# Patient Record
(demographics unavailable — no encounter records)

---

## 2024-10-08 NOTE — REASON FOR VISIT
[Follow-Up] : a follow-up visit [Home] : at home, [unfilled] , at the time of the visit. [Medical Office: ([facility])___] : at the medical office located in  [Family Member] : family member [Patient] : the patient [FreeTextEntry2] : Mary Lou Hussein

## 2024-10-08 NOTE — DISCUSSION/SUMMARY
[Cancer Type / Location / Histology Subtype: ________] : Cancer Type / Location / Histology Subtype: [unfilled] [Diagnosis Date (year): ____] : Diagnosis Date (year): [unfilled] [Surgery] : Surgery: Yes [Surgery Date(s) (year): ____] : Surgery Date(s) (year): [unfilled] [Surgical Procedure / Location / Findings: _________] : Surgical Procedure / Location / Findings: [unfilled] [Radiation] : Radiation: Yes [Systemic Therapy (chemotherapy, hormonal therapy, other)] : Systemic Therapy (chemotherapy, hormonal therapy, other): No [FreeTextEntry1] : Breast mass was found on routine mammogram in 2012; US -guided biopsy, 12/7/2012, found 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 SBR score 5/9 (2+2+1). Invasive tumor measures at least 0.4 cm. DCIS, cribiform pattern with intermediate grade nuclear atypia. Microcalcifications are presents in non-neoplastic breast. Lymphovascular permeation by tumor not seen. ER positive (98%), ID positive (60%), HER2 negative She underwent a lumpectomy with SLNB. Final pathology: Infiltrating duct cell carcinoma, completely excised. Size of largest focus off invasive carcinoma is 9 mm. Two negative lymph nodes. Oncotype Dx recurrent score of 13 with 8% average rate of distant recurrence. No chemotherapy was given. The patient completed radiation therapy and Anastrozole was started June 2013 with plan for 5 years of treatment, now extended to 10 years. GYN: Dr. Macey Maldonado ([phone]) Surgeon: Dr. Jo ([facility]) Radiation: Dr. Jo

## 2024-10-08 NOTE — HEALTH RISK ASSESSMENT
[Never] : Never [Intercurrent hospitalizations] : was admitted to the hospital  [No] : No [FreeTextEntry7] : Lamine most of her meals at home -- B -- slice of bread, L -- bread and rice with vegetable/lentil, D -- meat with fish with bread or rice.  [FreeTextEntry8] : Goes to sleep around 1130pm and wakes up 430am to pray and then tries to go back to sleep, [FreeTextEntry9] : Has been doing some stretches for her leg. Was doing yoga in the past but because of recent hospitalizations, she stopped and has not gotten back to it.  [de-identified] : March 2023 -- at [facility] -- for biliary complications from gallstones.

## 2024-10-08 NOTE — DATA REVIEWED
[FreeTextEntry1] : ECG completed in clinic today -- normal sinus rhythm, normal axis, no ST or T wave abnormality.

## 2024-10-08 NOTE — DISCUSSION/SUMMARY
[Cancer Type / Location / Histology Subtype: ________] : Cancer Type / Location / Histology Subtype: [unfilled] [Diagnosis Date (year): ____] : Diagnosis Date (year): [unfilled] [Surgery] : Surgery: Yes [Surgery Date(s) (year): ____] : Surgery Date(s) (year): [unfilled] [Surgical Procedure / Location / Findings: _________] : Surgical Procedure / Location / Findings: [unfilled] [Radiation] : Radiation: Yes [Systemic Therapy (chemotherapy, hormonal therapy, other)] : Systemic Therapy (chemotherapy, hormonal therapy, other): No [FreeTextEntry1] : Breast mass was found on routine mammogram in 2012; US -guided biopsy, 12/7/2012, found 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 SBR score 5/9 (2+2+1). Invasive tumor measures at least 0.4 cm. DCIS, cribiform pattern with intermediate grade nuclear atypia. Microcalcifications are presents in non-neoplastic breast. Lymphovascular permeation by tumor not seen. ER positive (98%), [place] positive (60%), HER2 negative She underwent a lumpectomy with SLNB. Final pathology: Infiltrating duct cell carcinoma, completely excised. Size of largest focus off invasive carcinoma is 9 mm. Two negative lymph nodes. Oncotype Dx recurrent score of 13 with 8% average rate of distant recurrence. No chemotherapy was given. The patient completed radiation therapy and Anastrozole was started June 2013 with plan for 5 years of treatment, now extended to 10 years. GYN: Dr. Macey Maldonado ([phone]) Surgeon: Dr. Jo ([facility]) Radiation: Dr. Jo

## 2024-10-08 NOTE — PHYSICAL EXAM
[No Acute Distress] : no acute distress [Well Nourished] : well nourished [Well-Appearing] : well-appearing [No Respiratory Distress] : no respiratory distress  [No Accessory Muscle Use] : no accessory muscle use [Normal Affect] : the affect was normal [Normal Insight/Judgement] : insight and judgment were intact [de-identified] : Straight leg raise on the LEFT results in LEFT hip pain that is localized and does not radiate below the knee. No tenderness above the trochanteric bursa.  [Normal] : affect was normal and insight and judgment were intact [de-identified] : obese [de-identified] : Noted dry mouth

## 2024-10-08 NOTE — HISTORY OF PRESENT ILLNESS
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 DCIS, She underwent a lumpectomy with SLNB. Final pathology: Infiltrating duct cell carcinoma, completely excised. Size of largest focus off invasive carcinoma is 9 mm. Two negative lymph nodes. No chemotherapy was given. The patient completed radiation therapy and Anastrozole which was started June 2013 for 10 years and completed 2023.  Pt being seen today for survivorship follow up.  Overall, feeling well today. Reviewed mammogram and ultrasound breast results from September 25, 2024 which were read negative Patient reports seeing her dentist regarding her dry mouth symptoms, she says her dentist prescribed antibiotic therapy for a year and patient feels she unable to follow through with this regimen due to fasting several times throughout the year. She finds certain over-the-counter mouthwashes help temporarily relieve the dryness and will continue to use. She reports right upper quadrant 7 out of 10 pain and sometimes 10 out of 10 pain due to a partial cholecystectomy she had done 20 years ago.  She has been treated several times including an emergency room visit last year March 2023 due to cholecystitis. She is now scheduled to have a laparoscopic completion of cholecystectomy on November 13, 2024.  Pt encourage to continue her exercise regimen which includes Physical therapy 2 x a week, resistance training and stretching for 30 minutes every other day and 30 minutes of walking each day once in the morning and again after dinner.

## 2024-10-08 NOTE — PHYSICAL EXAM
[No Acute Distress] : no acute distress [Well Nourished] : well nourished [Well-Appearing] : well-appearing [No Respiratory Distress] : no respiratory distress  [No Accessory Muscle Use] : no accessory muscle use [Normal Affect] : the affect was normal [Normal Insight/Judgement] : insight and judgment were intact [de-identified] : Straight leg raise on the LEFT results in LEFT hip pain that is localized and does not radiate below the knee. No tenderness above the trochanteric bursa.  [Normal] : affect was normal and insight and judgment were intact [de-identified] : obese [de-identified] : Noted dry mouth

## 2024-10-08 NOTE — HEALTH RISK ASSESSMENT
[Never] : Never [Intercurrent hospitalizations] : was admitted to the hospital  [No] : No [FreeTextEntry7] : Lamine most of her meals at home -- B -- slice of bread, L -- bread and rice with vegetable/lentil, D -- meat with fish with bread or rice.  [FreeTextEntry8] : Goes to sleep around 1130pm and wakes up 430am to pray and then tries to go back to sleep, [FreeTextEntry9] : Has been doing some stretches for her leg. Was doing yoga in the past but because of recent hospitalizations, she stopped and has not gotten back to it.  [de-identified] : March 2023 -- at [facility] -- for biliary complications from gallstones.

## 2024-10-08 NOTE — PLAN
[Survivorship] : Survivorship [FreeTextEntry3] : Needs annual breast exam and chest wall exam.  Needs annual mammogram and ultrasound of the bilateral breasts.  Next due for both September 2025.  [FreeTextEntry5] : Completed colon cancer screening in 2020 -- based on pathology from polyp pathology, does not need repeat until after 2025. Lung Cancer screening not indicated.  Gyn screening completed in 2021.  [FreeTextEntry7] : Needs annual ECG given radiation history to the LEFT breast -- this was completed today and is within normal limits.  Also needs annual TSH given radiation history.  [de-identified] : Osteoporosis - DEXA scan completed 8/2024 and results reveal Osteopenia. Continue taking calcium and vitamin D supplements. Continue PT, daily weight bearing exercises.    Obesity --  Pt encourage to continue her exercise regimen which includes Physical therapy 2 x a week, resistance training and stretching for 30 minutes every other day and 30 minutes of walking twice a day. Pt also encouraged to maintain a healthy weight and diet. Full set of labs ordered 10/7/2024  [de-identified] : Discussed the importance of exercise, and she has restarted her chair yoga which she finds somewhat helpful. Also discussed revisiting acupuncture as she did get some relief with that.

## 2024-10-08 NOTE — PLAN
[Survivorship] : Survivorship [FreeTextEntry3] : Needs annual breast exam and chest wall exam.  Needs annual mammogram and ultrasound of the bilateral breasts.  Next due for both September 2025.  [FreeTextEntry5] : Completed colon cancer screening in 2020 -- based on pathology from polyp pathology, does not need repeat until after 2025. Lung Cancer screening not indicated.  Gyn screening completed in 2021.  [FreeTextEntry7] : Needs annual ECG given radiation history to the LEFT breast -- this was completed today and is within normal limits.  Also needs annual TSH given radiation history.  [de-identified] : Osteoporosis - DEXA scan completed 8/2024 and results reveal Osteopenia. Continue taking calcium and vitamin D supplements. Continue PT, daily weight bearing exercises.    Obesity --  Pt encourage to continue her exercise regimen which includes Physical therapy 2 x a week, resistance training and stretching for 30 minutes every other day and 30 minutes of walking twice a day. Pt also encouraged to maintain a healthy weight and diet. Full set of labs ordered 10/7/2024  [de-identified] : Discussed the importance of exercise, and she has restarted her chair yoga which she finds somewhat helpful. Also discussed revisiting acupuncture as she did get some relief with that.

## 2024-10-08 NOTE — REASON FOR VISIT
[Follow-Up] : a follow-up visit [Home] : at home, [unfilled] , at the time of the visit. [Medical Office: ([facility])___] : at the medical office located in  [Family Member] : family member [Patient] : the patient [FreeTextEntry2] : Mary Lou Hussein

## 2024-10-30 NOTE — DISCUSSION/SUMMARY
[Cancer Type / Location / Histology Subtype: ________] : Cancer Type / Location / Histology Subtype: [unfilled] [Diagnosis Date (year): ____] : Diagnosis Date (year): [unfilled] [Surgery] : Surgery: Yes [Surgery Date(s) (year): ____] : Surgery Date(s) (year): [unfilled] [Surgical Procedure / Location / Findings: _________] : Surgical Procedure / Location / Findings: [unfilled] [Radiation] : Radiation: Yes [Systemic Therapy (chemotherapy, hormonal therapy, other)] : Systemic Therapy (chemotherapy, hormonal therapy, other): No [FreeTextEntry1] : Breast mass was found on routine mammogram in 2012; US -guided biopsy, 12/7/2012, found 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 SBR score 5/9 (2+2+1). Invasive tumor measures at least 0.4 cm. DCIS, cribiform pattern with intermediate grade nuclear atypia. Microcalcifications are presents in non-neoplastic breast. Lymphovascular permeation by tumor not seen. ER positive (98%), [place] positive (60%), HER2 negative She underwent a lumpectomy with SLNB. Final pathology: Infiltrating duct cell carcinoma, completely excised. Size of largest focus off invasive carcinoma is 9 mm. Two negative lymph nodes. Oncotype Dx recurrent score of 13 with 8% average rate of distant recurrence. No chemotherapy was given. The patient completed radiation therapy and Anastrozole was started June 2013 with plan for 5 years of treatment, now extended to 10 years. GYN: Dr. Macey Maldonado ([phone]) Surgeon: Dr. Jo ([facility]) Radiation: Dr. Jo

## 2024-10-30 NOTE — PHYSICAL EXAM
[No Acute Distress] : no acute distress [Well Nourished] : well nourished [Well-Appearing] : well-appearing [No Respiratory Distress] : no respiratory distress  [No Accessory Muscle Use] : no accessory muscle use [Normal Affect] : the affect was normal [Normal Insight/Judgement] : insight and judgment were intact [Normal] : affect was normal and insight and judgment were intact [de-identified] : obese [de-identified] : Noted dry mouth

## 2024-10-30 NOTE — HISTORY OF PRESENT ILLNESS
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 DCIS, She underwent a lumpectomy with SLNB. Final pathology: Infiltrating duct cell carcinoma, completely excised. Size of largest focus off invasive carcinoma is 9 mm. Two negative lymph nodes. No chemotherapy was given. The patient completed radiation therapy and Anastrozole which was started June 2013 for 10 years and completed 2023.  Pt being seen today for survivorship follow up.  Overall, feeling well today. Reviewed recent lab results. Reviewed mammogram and ultrasound breast results from September 25, 2024 which were read negative Pt looking foward to a completion cholecystectomy on 11/13/2024.   Patient reports seeing her dentist regarding her dry mouth symptoms, she says her dentist prescribed antibiotic therapy for a year and patient feels she unable to follow through with this regimen due to fasting several times throughout the year. She finds certain over-the-counter mouthwashes help temporarily relieve the dryness and will continue to use. She reports right upper quadrant 7 out of 10 pain and sometimes 10 out of 10 pain due to a partial cholecystectomy she had done 20 years ago.  She has been treated several times including an emergency room visit last year March 2023 due to cholecystitis. She is now scheduled to have a laparoscopic completion of cholecystectomy on November 13, 2024.  Pt encourage to continue her exercise regimen which includes Physical therapy 2 x a week, resistance training and stretching for 30 minutes every other day and 30 minutes of walking each day once in the morning and again after dinner.

## 2024-10-30 NOTE — PLAN
[Survivorship] : Survivorship [FreeTextEntry3] : Needs annual breast exam and chest wall exam.  Needs annual mammogram and ultrasound of the bilateral breasts.  Next due for both September 2025.  [FreeTextEntry5] : Completed colon cancer screening in 2020 -- based on pathology from polyp pathology, does not need repeat until after 2025. Lung Cancer screening not indicated.  Gyn screening completed in 2021.  [FreeTextEntry7] : Needs annual ECG given radiation history to the LEFT breast -- this was completed today and is within normal limits.  Also needs annual TSH given radiation history.  [de-identified] : Osteoporosis - DEXA scan completed 8/2024 and results reveal Osteopenia. Continue taking calcium and vitamin D supplements. Continue PT, daily weight bearing exercises.    Obesity --  Pt encourage to continue her exercise regimen which includes Physical therapy 2 x a week, resistance training and stretching for 30 minutes every other day and 30 minutes of walking twice a day. Pt also encouraged to maintain a healthy weight and diet. Full set of labs done 10/7/2024  [de-identified] : Discussed the importance of exercise, and she has restarted her chair yoga which she finds somewhat helpful. Also discussed revisiting acupuncture as she did get some relief with that.

## 2024-10-30 NOTE — HEALTH RISK ASSESSMENT
[Never] : Never [Intercurrent hospitalizations] : was admitted to the hospital  [No] : No [FreeTextEntry7] : Lamine most of her meals at home -- B -- slice of bread, L -- bread and rice with vegetable/lentil, D -- meat with fish with bread or rice.  [FreeTextEntry8] : Goes to sleep around 1130pm and wakes up 430am to pray and then tries to go back to sleep, [FreeTextEntry9] : Has been doing some stretches for her leg. Was doing yoga in the past but because of recent hospitalizations, she stopped and has not gotten back to it.  [de-identified] : March 2023 -- at [facility] -- for biliary complications from gallstones.

## 2024-10-30 NOTE — REASON FOR VISIT
[Home] : at home, [unfilled] , at the time of the visit. [Medical Office: ([facility])___] : at the medical office located in  [Patient] : the patient [Follow-Up] : a follow-up visit [Family Member] : family member [FreeTextEntry2] : Mary Lou Hussein

## 2024-11-25 NOTE — PLAN
[FreeTextEntry1] : 64 year old woman s/p completion cholecystectomy - pathology: chronic cholecystitis and cholelithiasis discussed with patient and family - follow up as needed

## 2024-11-25 NOTE — HISTORY OF PRESENT ILLNESS
[de-identified] : 64 year old woman s/p laparoscopic, robotic assisted, completion cholecystectomy, on 11/13/2024. [de-identified] : Patient presents to the office for a follow up visit. Had nausea for the first two post-op days after surgery; required ODT Zofran.  Today in the office denies any nausea, vomiting, fever or chills. Tolerating PO intake with normal GI and  function. Ambulating without issues.   On exam: awake, alert No scleral icterus Breathing comfortably on room air Abd is soft, not distended, and not tender Incisions are clean, dry and intact without erythema

## 2025-02-03 NOTE — DISCUSSION/SUMMARY
[Cancer Type / Location / Histology Subtype: ________] : Cancer Type / Location / Histology Subtype: [unfilled] [Diagnosis Date (year): ____] : Diagnosis Date (year): [unfilled] [Surgery] : Surgery: Yes [Surgery Date(s) (year): ____] : Surgery Date(s) (year): [unfilled] [Surgical Procedure / Location / Findings: _________] : Surgical Procedure / Location / Findings: [unfilled] [Radiation] : Radiation: Yes [Systemic Therapy (chemotherapy, hormonal therapy, other)] : Systemic Therapy (chemotherapy, hormonal therapy, other): No [FreeTextEntry1] : Breast mass was found on routine mammogram in 2012; US -guided biopsy, 12/7/2012, found 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 SBR score 5/9 (2+2+1). Invasive tumor measures at least 0.4 cm. DCIS, cribiform pattern with intermediate grade nuclear atypia. Microcalcifications are presents in non-neoplastic breast. Lymphovascular permeation by tumor not seen. ER positive (98%), [place] positive (60%), HER2 negative She underwent a lumpectomy with SLNB. Final pathology: Infiltrating duct cell carcinoma, completely excised. Size of largest focus off invasive carcinoma is 9 mm. Two negative lymph nodes. Oncotype Dx recurrent score of 13 with 8% average rate of distant recurrence. No chemotherapy was given. The patient completed radiation therapy and Anastrozole was started June 2013 with plan for 5 years of treatment, now extended to 10 years. GYN: Dr. Macey Maldonado ([phone]) Surgeon: Dr. Jo ([facility]) Radiation: Dr. Jo

## 2025-02-03 NOTE — PLAN
[FreeTextEntry7] : Needs annual ECG given radiation history to the LEFT breast -- this was completed today and is within normal limits.  Also needs annual TSH given radiation history.  [Survivorship] : Survivorship [FreeTextEntry3] : Needs annual breast exam and chest wall exam. 2/3/2025 Needs annual mammogram and ultrasound of the bilateral breasts. - 9/2025.  [FreeTextEntry6] : Lung Cancer screening not indicated.  Gyn screening completed in 2021.  [FreeTextEntry5] : Completed colon cancer screening in 2020 -- based on pathology from polyp pathology, does not need repeat until after 2025.  [FreeTextEntry8] : Needs annual ECG given radiation history to the LEFT breast -- this was completed 2/2025 and is within normal limits.  Also needs annual TSH given radiation history. 10/2024 [FreeTextEntry9] : Osteoporosis - DEXA scan completed 8/2024 and results reveal Osteopenia. [de-identified] : Continue taking calcium and vitamin D supplements. Continue PT, daily weight bearing exercises.    Obesity --  Pt encourage to continue her exercise regimen which includes Physical therapy 2 x a week, resistance training and stretching for 30 minutes every other day and 30 minutes of walking twice a day. Pt also encouraged to maintain a healthy weight and diet. Full set of labs done 10/7/2024  [de-identified] : Discussed the importance of exercise, and she has restarted her chair yoga which she finds somewhat helpful. Also discussed revisiting acupuncture as she did get some relief with that.

## 2025-02-03 NOTE — PHYSICAL EXAM
[No Acute Distress] : no acute distress [Well Nourished] : well nourished [Well-Appearing] : well-appearing [No Respiratory Distress] : no respiratory distress  [No Accessory Muscle Use] : no accessory muscle use [Normal Affect] : the affect was normal [Normal Insight/Judgement] : insight and judgment were intact [Normal] : affect was normal and insight and judgment were intact [Normal Appearance] : normal in appearance [No Masses] : no palpable masses [No Nipple Discharge] : no nipple discharge [No Axillary Lymphadenopathy] : no axillary lymphadenopathy [de-identified] : obese [de-identified] : Noted dry mouth

## 2025-02-03 NOTE — DISCUSSION/SUMMARY
[Cancer Type / Location / Histology Subtype: ________] : Cancer Type / Location / Histology Subtype: [unfilled] [Diagnosis Date (year): ____] : Diagnosis Date (year): [unfilled] [Surgery] : Surgery: Yes [Surgery Date(s) (year): ____] : Surgery Date(s) (year): [unfilled] [Surgical Procedure / Location / Findings: _________] : Surgical Procedure / Location / Findings: [unfilled] [Radiation] : Radiation: Yes [Systemic Therapy (chemotherapy, hormonal therapy, other)] : Systemic Therapy (chemotherapy, hormonal therapy, other): No [FreeTextEntry1] : Breast mass was found on routine mammogram in 2012; US -guided biopsy, 12/7/2012, found 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 SBR score 5/9 (2+2+1). Invasive tumor measures at least 0.4 cm. DCIS, cribiform pattern with intermediate grade nuclear atypia. Microcalcifications are presents in non-neoplastic breast. Lymphovascular permeation by tumor not seen. ER positive (98%), [place] positive (60%), HER2 negative She underwent a lumpectomy with SLNB. Final pathology: Infiltrating duct cell carcinoma, completely excised. Size of largest focus off invasive carcinoma is 9 mm. Two negative lymph nodes. Oncotype Dx recurrent score of 13 with 8% average rate of distant recurrence. No chemotherapy was given. The patient completed radiation therapy and Anastrozole was started June 2013 with plan for 5 years of treatment, now extended to 10 years. GYN: Dr. Macye Maldonado ([phone]) Surgeon: Dr. Jo ([facility]) Radiation: Dr. Jo

## 2025-02-03 NOTE — PLAN
[FreeTextEntry7] : Needs annual ECG given radiation history to the LEFT breast -- this was completed today and is within normal limits.  Also needs annual TSH given radiation history.  [Survivorship] : Survivorship [FreeTextEntry3] : Needs annual breast exam and chest wall exam. 2/3/2025 Needs annual mammogram and ultrasound of the bilateral breasts. - 9/2025.  [FreeTextEntry6] : Lung Cancer screening not indicated.  Gyn screening completed in 2021.  [FreeTextEntry5] : Completed colon cancer screening in 2020 -- based on pathology from polyp pathology, does not need repeat until after 2025.  [FreeTextEntry8] : Needs annual ECG given radiation history to the LEFT breast -- this was completed 2/2025 and is within normal limits.  Also needs annual TSH given radiation history. 10/2024 [FreeTextEntry9] : Osteoporosis - DEXA scan completed 8/2024 and results reveal Osteopenia. [de-identified] : Continue taking calcium and vitamin D supplements. Continue PT, daily weight bearing exercises.    Obesity --  Pt encourage to continue her exercise regimen which includes Physical therapy 2 x a week, resistance training and stretching for 30 minutes every other day and 30 minutes of walking twice a day. Pt also encouraged to maintain a healthy weight and diet. Full set of labs done 10/7/2024  [de-identified] : Discussed the importance of exercise, and she has restarted her chair yoga which she finds somewhat helpful. Also discussed revisiting acupuncture as she did get some relief with that.

## 2025-02-03 NOTE — REASON FOR VISIT
[Home] : at home, [unfilled] , at the time of the visit. [Medical Office: ([facility])___] : at the medical office located in  [Family Member] : family member [Patient] : the patient [FreeTextEntry2] : Mary Lou Hussein [Acute] : an acute visit

## 2025-02-03 NOTE — HEALTH RISK ASSESSMENT
[Never] : Never [Intercurrent hospitalizations] : was admitted to the hospital  [No] : No [FreeTextEntry7] : Lamine most of her meals at home -- B -- slice of bread, L -- bread and rice with vegetable/lentil, D -- meat with fish with bread or rice.  [FreeTextEntry8] : Goes to sleep around 1130pm and wakes up 430am to pray and then tries to go back to sleep, [FreeTextEntry9] : Has been doing some stretches for her leg. Was doing yoga in the past but because of recent hospitalizations, she stopped and has not gotten back to it.  [de-identified] : March 2023 -- at [facility] -- for biliary complications from gallstones.

## 2025-02-03 NOTE — HEALTH RISK ASSESSMENT
[Never] : Never [Intercurrent hospitalizations] : was admitted to the hospital  [No] : No [FreeTextEntry7] : Lamine most of her meals at home -- B -- slice of bread, L -- bread and rice with vegetable/lentil, D -- meat with fish with bread or rice.  [FreeTextEntry8] : Goes to sleep around 1130pm and wakes up 430am to pray and then tries to go back to sleep, [FreeTextEntry9] : Has been doing some stretches for her leg. Was doing yoga in the past but because of recent hospitalizations, she stopped and has not gotten back to it.  [de-identified] : March 2023 -- at [facility] -- for biliary complications from gallstones.

## 2025-02-03 NOTE — HISTORY OF PRESENT ILLNESS
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 DCIS, She underwent a lumpectomy with SLNB. Final pathology: Infiltrating duct cell carcinoma, completely excised. Size of largest focus off invasive carcinoma is 9 mm. Two negative lymph nodes. No chemotherapy was given. The patient completed radiation therapy and Anastrozole which was started June 2013 for 10 years and completed 2023.  Pt being seen today for survivorship follow up - Acute visit  Pt presents with a 2-week history of intermittent shooting pain in the left breast.  Pain intensity varies between 3/10 and 10/10, occurring sporadically throughout the day and not daily.  No alleviating factors identified; pt does not use pain medication, reporting spontaneous resolution of pain episodes.  Approximately two weeks ago, several family members contracted norovirus.  She experienced a mild case, characterized by abdominal gurgling without nausea or diarrhea and believes the onset of breast discomfort coincided with this illness.  Reviewed recent lab results. Reviewed mammogram and ultrasound breast results from September 25, 2024, which were read negative Pt is s/p completion cholecystectomy on 11/13/2024.   Patient reports seeing her dentist regarding her dry mouth symptoms, she says her dentist prescribed antibiotic therapy for a year and patient feels she unable to follow through with this regimen due to fasting several times throughout the year. She finds certain over-the-counter mouthwashes help temporarily relieve the dryness and will continue to use.  Pt encourage to continue her exercise regimen which includes Physical therapy 2 x a week, resistance training and stretching for 30 minutes every other day and 30 minutes of walking each day once in the morning and again after dinner.

## 2025-02-03 NOTE — REASON FOR VISIT
[Home] : at home, [unfilled] , at the time of the visit. [Medical Office: ([facility])___] : at the medical office located in  [Family Member] : family member [Patient] : the patient [FreeTextEntry2] : Mary Lou Hussein [Acute] : an acute visit

## 2025-02-03 NOTE — PHYSICAL EXAM
[No Acute Distress] : no acute distress [Well Nourished] : well nourished [Well-Appearing] : well-appearing [No Respiratory Distress] : no respiratory distress  [No Accessory Muscle Use] : no accessory muscle use [Normal Affect] : the affect was normal [Normal Insight/Judgement] : insight and judgment were intact [Normal] : affect was normal and insight and judgment were intact [Normal Appearance] : normal in appearance [No Masses] : no palpable masses [No Nipple Discharge] : no nipple discharge [No Axillary Lymphadenopathy] : no axillary lymphadenopathy [de-identified] : obese [de-identified] : Noted dry mouth

## 2025-05-09 NOTE — PLAN
[FreeTextEntry1] : 64 year old woman with self resolved abdominal pain - reassurances given to the patient and family that no intervention required at this time as pain resolved spontaneously - discussed with them that if pain returns, we can obtain imaging for further evaluation. Patient and family in agreement with plan.  - follow up as needed

## 2025-05-09 NOTE — HISTORY OF PRESENT ILLNESS
[de-identified] : 64 year old woman s/p laparoscopic, robotic assisted, completion cholecystectomy, on 11/13/2024. [de-identified] : She presents to the office today as she had some right upper quadrant pain 2-3 days ago. She states the pain lasted for approximately 1 day, and then self resolved. Did not take any pain medication. Had no nausea, or vomiting. No fever or chills. Continued to take PO intake without issues. Having GI function. States pain now fully resolved.   On exam: awake, alert and oriented No scleral icterus Breathing comfortably on room air; no cough Abd is soft ,not tender and not distended No rebound, no guarding Incisions are all well healed.

## 2025-07-01 NOTE — REASON FOR VISIT
[Home] : at home, [unfilled] , at the time of the visit. [Other Location: e.g. Home (Enter Location, City,State)___] : at [unfilled] [Telehealth (audio & video)] : This visit was provided via telehealth using real-time 2-way audio visual technology. [Verbal consent obtained from patient] : the patient, [unfilled] [Follow-Up] : a follow-up visit

## 2025-07-02 NOTE — DISCUSSION/SUMMARY
[Cancer Type / Location / Histology Subtype: ________] : Cancer Type / Location / Histology Subtype: [unfilled] [Diagnosis Date (year): ____] : Diagnosis Date (year): [unfilled] [Surgery] : Surgery: Yes [Surgery Date(s) (year): ____] : Surgery Date(s) (year): [unfilled] [Surgical Procedure / Location / Findings: _________] : Surgical Procedure / Location / Findings: [unfilled] [Radiation] : Radiation: Yes [Systemic Therapy (chemotherapy, hormonal therapy, other)] : Systemic Therapy (chemotherapy, hormonal therapy, other): No [FreeTextEntry1] : Breast mass was found on routine mammogram in 2012; US -guided biopsy, 12/7/2012, found 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 SBR score 5/9 (2+2+1). Invasive tumor measures at least 0.4 cm. DCIS, cribiform pattern with intermediate grade nuclear atypia. Microcalcifications are presents in non-neoplastic breast. Lymphovascular permeation by tumor not seen. ER positive (98%), NE positive (60%), HER2 negative She underwent a lumpectomy with SLNB. Final pathology: Infiltrating duct cell carcinoma, completely excised. Size of largest focus off invasive carcinoma is 9 mm. Two negative lymph nodes. Oncotype Dx recurrent score of 13 with 8% average rate of distant recurrence. No chemotherapy was given. The patient completed radiation therapy and Anastrozole was started June 2013 with plan for 5 years of treatment, now extended to 10 years. GYN: Dr. Macey Maldonado ([phone]) Surgeon: Dr. Jo ([facility]) Radiation: Dr. Jo

## 2025-07-02 NOTE — PHYSICAL EXAM
[No Acute Distress] : no acute distress [Well Nourished] : well nourished [Well-Appearing] : well-appearing [No Respiratory Distress] : no respiratory distress  [No Accessory Muscle Use] : no accessory muscle use [Normal Appearance] : normal in appearance [No Masses] : no palpable masses [No Nipple Discharge] : no nipple discharge [No Axillary Lymphadenopathy] : no axillary lymphadenopathy [Normal Affect] : the affect was normal [Normal Insight/Judgement] : insight and judgment were intact [Normal] : affect was normal and insight and judgment were intact [de-identified] : Noted dry mouth  [de-identified] : obese

## 2025-07-02 NOTE — HEALTH RISK ASSESSMENT
[Never] : Never [Intercurrent hospitalizations] : was admitted to the hospital  [No] : No [FreeTextEntry7] : Lamine most of her meals at home -- B -- slice of bread, L -- bread and rice with vegetable/lentil, D -- meat with fish with bread or rice.  [FreeTextEntry8] : Goes to sleep around 1130pm and wakes up 430am to pray and then tries to go back to sleep, [FreeTextEntry9] : Has been doing some stretches for her leg. Was doing yoga in the past but because of recent hospitalizations, she stopped and has not gotten back to it.  [de-identified] : March 2023 -- at [facility] -- for biliary complications from gallstones.

## 2025-07-02 NOTE — DISCUSSION/SUMMARY
[Cancer Type / Location / Histology Subtype: ________] : Cancer Type / Location / Histology Subtype: [unfilled] [Diagnosis Date (year): ____] : Diagnosis Date (year): [unfilled] [Surgery] : Surgery: Yes [Surgery Date(s) (year): ____] : Surgery Date(s) (year): [unfilled] [Surgical Procedure / Location / Findings: _________] : Surgical Procedure / Location / Findings: [unfilled] [Radiation] : Radiation: Yes [Systemic Therapy (chemotherapy, hormonal therapy, other)] : Systemic Therapy (chemotherapy, hormonal therapy, other): No [FreeTextEntry1] : Breast mass was found on routine mammogram in 2012; US -guided biopsy, 12/7/2012, found 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 SBR score 5/9 (2+2+1). Invasive tumor measures at least 0.4 cm. DCIS, cribiform pattern with intermediate grade nuclear atypia. Microcalcifications are presents in non-neoplastic breast. Lymphovascular permeation by tumor not seen. ER positive (98%), RI positive (60%), HER2 negative She underwent a lumpectomy with SLNB. Final pathology: Infiltrating duct cell carcinoma, completely excised. Size of largest focus off invasive carcinoma is 9 mm. Two negative lymph nodes. Oncotype Dx recurrent score of 13 with 8% average rate of distant recurrence. No chemotherapy was given. The patient completed radiation therapy and Anastrozole was started June 2013 with plan for 5 years of treatment, now extended to 10 years. GYN: Dr. Macey Maldonado ([phone]) Surgeon: Dr. Jo ([facility]) Radiation: Dr. Jo

## 2025-07-02 NOTE — PLAN
[Survivorship] : Survivorship [FreeTextEntry3] : Needs annual breast exam and chest wall exam. 2/3/2025 Needs annual mammogram and ultrasound of the bilateral breasts. - 9/2025.  [de-identified] : Discussed the importance of exercise, and she has restarted her chair yoga which she finds somewhat helpful. Also discussed revisiting acupuncture as she did get some relief with that.  [FreeTextEntry2] : Needs annual breast exam and chest wall exam. 2/3/2025 Needs annual mammogram and ultrasound of the bilateral breasts. - 9/2025. ordered [FreeTextEntry6] : Lung Cancer screening not indicated.  Gyn screening completed in 2021.  [FreeTextEntry5] : Completed colon cancer screening in 2020 -- based on pathology from polyp pathology, does not need repeat until after 2025.  [FreeTextEntry8] : Needs annual ECG given radiation history to the LEFT breast -- this was completed 2/2025 and is within normal limits.  Also needs annual TSH given radiation history. 10/2024 [FreeTextEntry9] : Osteoporosis - DEXA scan completed 8/2024 and results reveal Osteopenia. [de-identified] : Continue taking calcium and vitamin D supplements.  Full set of labs done 10/7/2024  [de-identified] : Obesity -- Pt encouraged to maintain a healthy weight and diet. [de-identified] : Discussed the importance of exercise, and she has restarted her chair yoga which she finds somewhat helpful. Also discussed revisiting acupuncture as she did get some relief with that.

## 2025-07-02 NOTE — PHYSICAL EXAM
[No Acute Distress] : no acute distress [Well Nourished] : well nourished [Well-Appearing] : well-appearing [No Respiratory Distress] : no respiratory distress  [No Accessory Muscle Use] : no accessory muscle use [Normal Appearance] : normal in appearance [No Masses] : no palpable masses [No Nipple Discharge] : no nipple discharge [No Axillary Lymphadenopathy] : no axillary lymphadenopathy [Normal Affect] : the affect was normal [Normal Insight/Judgement] : insight and judgment were intact [Normal] : affect was normal and insight and judgment were intact [de-identified] : Noted dry mouth  [de-identified] : obese

## 2025-07-02 NOTE — HEALTH RISK ASSESSMENT
[Never] : Never [Intercurrent hospitalizations] : was admitted to the hospital  [No] : No [FreeTextEntry7] : Lamine most of her meals at home -- B -- slice of bread, L -- bread and rice with vegetable/lentil, D -- meat with fish with bread or rice.  [FreeTextEntry8] : Goes to sleep around 1130pm and wakes up 430am to pray and then tries to go back to sleep, [FreeTextEntry9] : Has been doing some stretches for her leg. Was doing yoga in the past but because of recent hospitalizations, she stopped and has not gotten back to it.  [de-identified] : March 2023 -- at [facility] -- for biliary complications from gallstones.

## 2025-07-02 NOTE — HISTORY OF PRESENT ILLNESS
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 DCIS, She underwent a lumpectomy with SLNB. Final pathology: Infiltrating duct cell carcinoma, completely excised. Size of largest focus off invasive carcinoma is 9 mm. Two negative lymph nodes. No chemotherapy was given. The patient completed radiation therapy and Anastrozole which was started June 2013 for 10 years and completed 2023.  Pt being seen today for survivorship follow up, breast pain  Pt had presented with a 2-week history of intermittent shooting pain in the left breast.  Pain intensity varies between 3/10 and 10/10, occurring sporadically throughout the day and not daily.  No alleviating factors identified; pt does not use pain medication, reporting spontaneous resolution of pain episodes.  Approximately two weeks ago, several family members contracted norovirus.  She experienced a mild case, characterized by abdominal gurgling without nausea or diarrhea and believes the onset of breast discomfort coincided with this illness.  Reviewed recent lab results. Reviewed mammogram and ultrasound breast results from September 25, 2024, which were read negative 7/2/25 - Today she reports left breast pain has completely subsided.  Pt is s/p completion cholecystectomy on 11/13/2024.   Patient reports seeing her dentist regarding her dry mouth symptoms, she says her dentist prescribed antibiotic therapy for a year and patient feels she unable to follow through with this regimen due to fasting several times throughout the year. She finds certain over-the-counter mouthwashes help temporarily relieve the dryness and will continue to use.  Pt encourage to continue her exercise regimen which includes Physical therapy 2 x a week, resistance training and stretching for 30 minutes every other day and 30 minutes of walking each day once in the morning and again after dinner.

## 2025-07-02 NOTE — PLAN
[Survivorship] : Survivorship [FreeTextEntry3] : Needs annual breast exam and chest wall exam. 2/3/2025 Needs annual mammogram and ultrasound of the bilateral breasts. - 9/2025.  [de-identified] : Discussed the importance of exercise, and she has restarted her chair yoga which she finds somewhat helpful. Also discussed revisiting acupuncture as she did get some relief with that.  [FreeTextEntry2] : Needs annual breast exam and chest wall exam. 2/3/2025 Needs annual mammogram and ultrasound of the bilateral breasts. - 9/2025. ordered [FreeTextEntry6] : Lung Cancer screening not indicated.  Gyn screening completed in 2021.  [FreeTextEntry5] : Completed colon cancer screening in 2020 -- based on pathology from polyp pathology, does not need repeat until after 2025.  [FreeTextEntry8] : Needs annual ECG given radiation history to the LEFT breast -- this was completed 2/2025 and is within normal limits.  Also needs annual TSH given radiation history. 10/2024 [FreeTextEntry9] : Osteoporosis - DEXA scan completed 8/2024 and results reveal Osteopenia. [de-identified] : Continue taking calcium and vitamin D supplements.  Full set of labs done 10/7/2024  [de-identified] : Obesity -- Pt encouraged to maintain a healthy weight and diet. [de-identified] : Discussed the importance of exercise, and she has restarted her chair yoga which she finds somewhat helpful. Also discussed revisiting acupuncture as she did get some relief with that.

## 2025-07-02 NOTE — HISTORY OF PRESENT ILLNESS
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 DCIS, She underwent a lumpectomy with SLNB. Final pathology: Infiltrating duct cell carcinoma, completely excised. Size of largest focus off invasive carcinoma is 9 mm. Two negative lymph nodes. No chemotherapy was given. The patient completed radiation therapy and Anastrozole which was started June 2013 for 10 years and completed 2023.  Pt being seen today for survivorship follow up, breast pain  Pt had presented with a 2-week history of intermittent shooting pain in the left breast.  Pain intensity varies between 3/10 and 10/10, occurring sporadically throughout the day and not daily.  No alleviating factors identified; pt does not use pain medication, reporting spontaneous resolution of pain episodes.  Approximately two weeks ago, several family members contracted norovirus.  She experienced a mild case, characterized by abdominal gurgling without nausea or diarrhea and believes the onset of breast discomfort coincided with this illness.  Reviewed recent lab results. Reviewed mammogram and ultrasound breast results from September 25, 2024, which were read negative 7/2/25 - Today she reports left breast pain has completely subsided.  Pt is s/p completion cholecystectomy on 11/13/2024.   Patient reports seeing her dentist regarding her dry mouth symptoms, she says her dentist prescribed antibiotic therapy for a year and patient feels she unable to follow through with this regimen due to fasting several times throughout the year. She finds certain over-the-counter mouthwashes help temporarily relieve the dryness and will continue to use.  Pt encourage to continue her exercise regimen which includes Physical therapy 2 x a week, resistance training and stretching for 30 minutes every other day and 30 minutes of walking each day once in the morning and again after dinner.